# Patient Record
Sex: MALE | Race: WHITE
[De-identification: names, ages, dates, MRNs, and addresses within clinical notes are randomized per-mention and may not be internally consistent; named-entity substitution may affect disease eponyms.]

---

## 2017-04-03 ENCOUNTER — APPOINTMENT (OUTPATIENT)
Dept: NEUROLOGY | Facility: CLINIC | Age: 27
End: 2017-04-03

## 2017-04-03 VITALS
WEIGHT: 170 LBS | HEIGHT: 68 IN | OXYGEN SATURATION: 98 % | DIASTOLIC BLOOD PRESSURE: 68 MMHG | HEART RATE: 61 BPM | SYSTOLIC BLOOD PRESSURE: 120 MMHG | BODY MASS INDEX: 25.76 KG/M2

## 2017-04-03 DIAGNOSIS — F07.81 POSTCONCUSSIONAL SYNDROME: ICD-10-CM

## 2017-04-03 RX ORDER — CLONIDINE HYDROCHLORIDE 0.1 MG/1
0.1 TABLET ORAL
Refills: 0 | Status: ACTIVE | COMMUNITY

## 2017-04-03 RX ORDER — METOPROLOL SUCCINATE 200 MG
200 TABLET, EXTENDED RELEASE 24 HR ORAL
Refills: 0 | Status: ACTIVE | COMMUNITY

## 2017-04-03 RX ORDER — CLONAZEPAM 1 MG/1
1 TABLET ORAL
Refills: 0 | Status: ACTIVE | COMMUNITY

## 2017-04-03 RX ORDER — VENLAFAXINE HCL 75 MG
75 TABLET ORAL
Refills: 0 | Status: ACTIVE | COMMUNITY

## 2017-04-03 RX ORDER — VENLAFAXINE HYDROCHLORIDE 150 MG/1
150 CAPSULE, EXTENDED RELEASE ORAL
Refills: 0 | Status: ACTIVE | COMMUNITY

## 2017-04-03 RX ORDER — CLONAZEPAM 2 MG/1
2 TABLET ORAL
Refills: 0 | Status: ACTIVE | COMMUNITY

## 2017-04-03 RX ORDER — EMTRICITABINE AND TENOFOVIR DISOPROXIL FUMARATE 200; 300 MG/1; MG/1
200-300 TABLET, FILM COATED ORAL
Refills: 0 | Status: ACTIVE | COMMUNITY

## 2017-04-03 RX ORDER — VENLAFAXINE HYDROCHLORIDE 75 MG/1
75 CAPSULE, EXTENDED RELEASE ORAL
Refills: 0 | Status: ACTIVE | COMMUNITY

## 2017-08-01 ENCOUNTER — OUTPATIENT (OUTPATIENT)
Dept: OUTPATIENT SERVICES | Facility: HOSPITAL | Age: 27
LOS: 1 days | Discharge: HOME | End: 2017-08-01

## 2017-08-02 DIAGNOSIS — F11.20 OPIOID DEPENDENCE, UNCOMPLICATED: ICD-10-CM

## 2018-05-25 ENCOUNTER — EMERGENCY (EMERGENCY)
Facility: HOSPITAL | Age: 28
LOS: 1 days | Discharge: HOME | End: 2018-05-25
Attending: EMERGENCY MEDICINE
Payer: COMMERCIAL

## 2018-05-25 VITALS
RESPIRATION RATE: 16 BRPM | DIASTOLIC BLOOD PRESSURE: 85 MMHG | SYSTOLIC BLOOD PRESSURE: 132 MMHG | HEART RATE: 86 BPM | OXYGEN SATURATION: 100 %

## 2018-05-25 VITALS
DIASTOLIC BLOOD PRESSURE: 85 MMHG | RESPIRATION RATE: 16 BRPM | SYSTOLIC BLOOD PRESSURE: 133 MMHG | OXYGEN SATURATION: 100 % | TEMPERATURE: 99 F | HEART RATE: 87 BPM

## 2018-05-25 LAB
ALBUMIN SERPL ELPH-MCNC: 4.2 G/DL — SIGNIFICANT CHANGE UP (ref 3.3–5)
ALP SERPL-CCNC: 46 U/L — SIGNIFICANT CHANGE UP (ref 40–120)
ALT FLD-CCNC: 40 U/L — SIGNIFICANT CHANGE UP (ref 10–45)
ANION GAP SERPL CALC-SCNC: 13 MMOL/L — SIGNIFICANT CHANGE UP (ref 5–17)
AST SERPL-CCNC: 37 U/L — SIGNIFICANT CHANGE UP (ref 10–40)
BASOPHILS # BLD AUTO: 0.1 K/UL — SIGNIFICANT CHANGE UP (ref 0–0.2)
BASOPHILS NFR BLD AUTO: 0.5 % — SIGNIFICANT CHANGE UP (ref 0–2)
BILIRUB SERPL-MCNC: 0.6 MG/DL — SIGNIFICANT CHANGE UP (ref 0.2–1.2)
BUN SERPL-MCNC: 27 MG/DL — HIGH (ref 7–23)
CALCIUM SERPL-MCNC: 9.2 MG/DL — SIGNIFICANT CHANGE UP (ref 8.4–10.5)
CHLORIDE SERPL-SCNC: 102 MMOL/L — SIGNIFICANT CHANGE UP (ref 96–108)
CO2 SERPL-SCNC: 24 MMOL/L — SIGNIFICANT CHANGE UP (ref 22–31)
CREAT SERPL-MCNC: 1.44 MG/DL — HIGH (ref 0.5–1.3)
EOSINOPHIL # BLD AUTO: 0.1 K/UL — SIGNIFICANT CHANGE UP (ref 0–0.5)
EOSINOPHIL NFR BLD AUTO: 0.5 % — SIGNIFICANT CHANGE UP (ref 0–6)
GLUCOSE SERPL-MCNC: 115 MG/DL — HIGH (ref 70–99)
HCT VFR BLD CALC: 50.9 % — HIGH (ref 39–50)
HGB BLD-MCNC: 17.1 G/DL — HIGH (ref 13–17)
LYMPHOCYTES # BLD AUTO: 46.5 % — HIGH (ref 13–44)
LYMPHOCYTES # BLD AUTO: 5.1 K/UL — HIGH (ref 1–3.3)
MCHC RBC-ENTMCNC: 30.9 PG — SIGNIFICANT CHANGE UP (ref 27–34)
MCHC RBC-ENTMCNC: 33.6 GM/DL — SIGNIFICANT CHANGE UP (ref 32–36)
MCV RBC AUTO: 91.9 FL — SIGNIFICANT CHANGE UP (ref 80–100)
MONOCYTES # BLD AUTO: 0.7 K/UL — SIGNIFICANT CHANGE UP (ref 0–0.9)
MONOCYTES NFR BLD AUTO: 6.6 % — SIGNIFICANT CHANGE UP (ref 2–14)
NEUTROPHILS # BLD AUTO: 5 K/UL — SIGNIFICANT CHANGE UP (ref 1.8–7.4)
NEUTROPHILS NFR BLD AUTO: 46 % — SIGNIFICANT CHANGE UP (ref 43–77)
PLATELET # BLD AUTO: 284 K/UL — SIGNIFICANT CHANGE UP (ref 150–400)
POTASSIUM SERPL-MCNC: 4 MMOL/L — SIGNIFICANT CHANGE UP (ref 3.5–5.3)
POTASSIUM SERPL-SCNC: 4 MMOL/L — SIGNIFICANT CHANGE UP (ref 3.5–5.3)
PROT SERPL-MCNC: 7.2 G/DL — SIGNIFICANT CHANGE UP (ref 6–8.3)
RBC # BLD: 5.55 M/UL — SIGNIFICANT CHANGE UP (ref 4.2–5.8)
RBC # FLD: 12.1 % — SIGNIFICANT CHANGE UP (ref 10.3–14.5)
SODIUM SERPL-SCNC: 139 MMOL/L — SIGNIFICANT CHANGE UP (ref 135–145)
WBC # BLD: 11 K/UL — HIGH (ref 3.8–10.5)
WBC # FLD AUTO: 11 K/UL — HIGH (ref 3.8–10.5)

## 2018-05-25 PROCEDURE — 96375 TX/PRO/DX INJ NEW DRUG ADDON: CPT

## 2018-05-25 PROCEDURE — 99284 EMERGENCY DEPT VISIT MOD MDM: CPT | Mod: 25

## 2018-05-25 PROCEDURE — 93005 ELECTROCARDIOGRAM TRACING: CPT

## 2018-05-25 PROCEDURE — 96374 THER/PROPH/DIAG INJ IV PUSH: CPT

## 2018-05-25 PROCEDURE — 80053 COMPREHEN METABOLIC PANEL: CPT

## 2018-05-25 PROCEDURE — 85027 COMPLETE CBC AUTOMATED: CPT

## 2018-05-25 PROCEDURE — 93010 ELECTROCARDIOGRAM REPORT: CPT | Mod: 59

## 2018-05-25 PROCEDURE — 96372 THER/PROPH/DIAG INJ SC/IM: CPT | Mod: XU

## 2018-05-25 RX ORDER — EPINEPHRINE 0.3 MG/.3ML
0.3 INJECTION INTRAMUSCULAR; SUBCUTANEOUS
Qty: 1 | Refills: 0 | OUTPATIENT
Start: 2018-05-25

## 2018-05-25 RX ORDER — SODIUM CHLORIDE 9 MG/ML
2000 INJECTION INTRAMUSCULAR; INTRAVENOUS; SUBCUTANEOUS ONCE
Qty: 0 | Refills: 0 | Status: COMPLETED | OUTPATIENT
Start: 2018-05-25 | End: 2018-05-25

## 2018-05-25 RX ORDER — EPINEPHRINE 0.3 MG/.3ML
0.3 INJECTION INTRAMUSCULAR; SUBCUTANEOUS ONCE
Qty: 0 | Refills: 0 | Status: COMPLETED | OUTPATIENT
Start: 2018-05-25 | End: 2018-05-25

## 2018-05-25 RX ORDER — DIPHENHYDRAMINE HCL 50 MG
50 CAPSULE ORAL ONCE
Qty: 0 | Refills: 0 | Status: COMPLETED | OUTPATIENT
Start: 2018-05-25 | End: 2018-05-25

## 2018-05-25 RX ORDER — FAMOTIDINE 10 MG/ML
20 INJECTION INTRAVENOUS ONCE
Qty: 0 | Refills: 0 | Status: COMPLETED | OUTPATIENT
Start: 2018-05-25 | End: 2018-05-25

## 2018-05-25 RX ORDER — EPINEPHRINE 0.3 MG/.3ML
0.3 INJECTION INTRAMUSCULAR; SUBCUTANEOUS ONCE
Qty: 0 | Refills: 0 | Status: DISCONTINUED | OUTPATIENT
Start: 2018-05-25 | End: 2018-05-25

## 2018-05-25 RX ADMIN — Medication 125 MILLIGRAM(S): at 07:20

## 2018-05-25 RX ADMIN — SODIUM CHLORIDE 2000 MILLILITER(S): 9 INJECTION INTRAMUSCULAR; INTRAVENOUS; SUBCUTANEOUS at 07:20

## 2018-05-25 RX ADMIN — EPINEPHRINE 0.3 MILLIGRAM(S): 0.3 INJECTION INTRAMUSCULAR; SUBCUTANEOUS at 07:20

## 2018-05-25 RX ADMIN — FAMOTIDINE 20 MILLIGRAM(S): 10 INJECTION INTRAVENOUS at 07:20

## 2018-05-25 RX ADMIN — Medication 50 MILLIGRAM(S): at 07:20

## 2018-05-25 NOTE — ED ADULT NURSE NOTE - OBJECTIVE STATEMENT
27M pt AxOx3 ambulatory to ED presenting w/ allergic rx. Pt states he has known allergy to walnuts but denies eating it but ate a cookie last night w/ macadamia nuts. Pt c/o diff breathing, anxiety, and itchiness. Pt denies CP/N/V. Upon arrival, pt presents w/ facial swelling/redness, hives throughout body. No stridor noted. Lungs clear bilaterally, resp even, unlabored, +tachypneic. Airway patent. #18G RACm labs drawn and sent. Pt treated w/ Epi, SoluMedrol, Benadryl, and Pepcid as per MD. CM in place - SinusTach. EKG @ bedside. MDs at bedside for eval.

## 2018-05-25 NOTE — ED PROVIDER NOTE - PROGRESS NOTE DETAILS
The patient has decided to leave against medical advice.  The patient is AAOx3, not intoxicated, and displays normal decision making ability. We discussed all risks, benefits, and alternatives to the progression of treatment and the potential dangers of leaving including but not limited to permanent disability, injury, and death.  The patient was instructed that they are welcome to change their decision to leave against medical advice and return to the emergency department at any time and for any reason in order to allow us to render care. Pt states he has to be at work and would like to leave AMA, mother at bedside. Explained to return for any worsening symptoms or if he changes his mind. Epi prescription, and steroids sent to the pharmacy. ENDERUJABIGAIL

## 2018-05-25 NOTE — ED ADULT NURSE REASSESSMENT NOTE - NS ED NURSE REASSESS COMMENT FT1
Pt is resting comfortably in bed and is much better in appearance. No redness/swelling/hives noted throughout. No resp distress noted. Pt states he feels better and wants to go to work. Mom at bedside. Will continue to monitor.

## 2018-05-25 NOTE — ED PROVIDER NOTE - PLAN OF CARE
Follow up with your PMD within 48-72 hours.  Show copies of your labs provided.  Recommend consult with an Allergist. Referral list provided.  Take Prednisone 40mg daily for 4 days, Pepcid 20mg 2x/day for 4 days, Benadryl 25mg every 8 hours for 4 days- caution drowsiness/do not drive. Worsening or new shortness of breath, tightness in your throat, swelling, chest pain, fever, chills, return to the ER

## 2018-05-25 NOTE — ED PROVIDER NOTE - PHYSICAL EXAMINATION
AAOX3, non toxic appearing. NCAT, PERRL, no injection, MMM, airway patent, OP without tongue elevation, drooling, uvula non-edematous, midline,. No strideor. Neck Supple, non-tender, no swelling appreciated no thyromegaly. RRR, CTABL, ABD S/NT/ND EXT warm, well perfused, No Edema, Distal Pulses Intact, diffuse urticarial rash on axilla, ext, neck and face. MAEx4, Sensation to soft touch grossly intact, normal strength/tone.

## 2018-05-25 NOTE — ED PROVIDER NOTE - ATTENDING CONTRIBUTION TO CARE
****ATTENDING**** 28yo male hx anxiety,. walnut allergies w anaphylaxis pw rash and difficulty breathing since this morning. State he may have eating nuts last night now w difficulty breathing. No recent illness, cough, uri, f/c. No new meds or travel.  On exam, pt with mild distress, skin erythema w urticaria, Patient is awake,alert,oriented x 3. Neck supple no stridor. Patient's chest is clear to ausculation, +s1s2. Abdomen is soft nd/nt +BS. Extremity with no swelling or calf tenderness. Tongue and uvula normal.   IM epi given, along w steroids and benadryl. Monitor pt in ED for allergic reaction for 6 hours.   Pt insist on leaving for work, mother at bedside, explained risk and rebound reaction and explained to stay to be monitored.

## 2018-05-25 NOTE — ED PROVIDER NOTE - CARE PLAN
Principal Discharge DX:	Anaphylaxis, initial encounter  Assessment and plan of treatment:	Follow up with your PMD within 48-72 hours.  Show copies of your labs provided.  Recommend consult with an Allergist. Referral list provided.  Take Prednisone 40mg daily for 4 days, Pepcid 20mg 2x/day for 4 days, Benadryl 25mg every 8 hours for 4 days- caution drowsiness/do not drive. Worsening or new shortness of breath, tightness in your throat, swelling, chest pain, fever, chills, return to the ER

## 2018-05-25 NOTE — ED PROVIDER NOTE - OBJECTIVE STATEMENT
27M hx of anxiety on Seroquel, Vyvanse. Effexor, presenting with acute onset shortness of breath and diffuse urticarial rash upon waking this morning. Has had prior reaction to walnuts. no nausea, vomiting, diarrhea, constipation, no fever, chills, no chest pain, no tongue or throat swelling, no eye redness, discharge or irritation.

## 2018-05-25 NOTE — ED PROVIDER NOTE - MEDICAL DECISION MAKING DETAILS
Allergic RXN concerning for anaphylaxis - Epi Pepcid, Benadryl, Prednisone, Fluids, Obs x 4 hours. Reasess.
